# Patient Record
Sex: FEMALE | URBAN - METROPOLITAN AREA
[De-identification: names, ages, dates, MRNs, and addresses within clinical notes are randomized per-mention and may not be internally consistent; named-entity substitution may affect disease eponyms.]

---

## 2018-10-10 ENCOUNTER — APPOINTMENT (RX ONLY)
Dept: URBAN - METROPOLITAN AREA CLINIC 115 | Facility: CLINIC | Age: 58
Setting detail: DERMATOLOGY
End: 2018-10-10

## 2018-10-10 VITALS — HEIGHT: 61 IN | WEIGHT: 148 LBS

## 2018-10-10 DIAGNOSIS — L82.0 INFLAMED SEBORRHEIC KERATOSIS: ICD-10-CM

## 2018-10-10 DIAGNOSIS — D22 MELANOCYTIC NEVI: ICD-10-CM

## 2018-10-10 DIAGNOSIS — L72.8 OTHER FOLLICULAR CYSTS OF THE SKIN AND SUBCUTANEOUS TISSUE: ICD-10-CM

## 2018-10-10 DIAGNOSIS — L81.4 OTHER MELANIN HYPERPIGMENTATION: ICD-10-CM

## 2018-10-10 DIAGNOSIS — D18.0 HEMANGIOMA: ICD-10-CM

## 2018-10-10 DIAGNOSIS — L91.8 OTHER HYPERTROPHIC DISORDERS OF THE SKIN: ICD-10-CM

## 2018-10-10 DIAGNOSIS — L82.1 OTHER SEBORRHEIC KERATOSIS: ICD-10-CM

## 2018-10-10 PROBLEM — D18.01 HEMANGIOMA OF SKIN AND SUBCUTANEOUS TISSUE: Status: ACTIVE | Noted: 2018-10-10

## 2018-10-10 PROBLEM — D22.72 MELANOCYTIC NEVI OF LEFT LOWER LIMB, INCLUDING HIP: Status: ACTIVE | Noted: 2018-10-10

## 2018-10-10 PROCEDURE — 17110 DESTRUCTION B9 LES UP TO 14: CPT

## 2018-10-10 PROCEDURE — ? BENIGN DESTRUCTION COSMETIC

## 2018-10-10 PROCEDURE — ? LIQUID NITROGEN

## 2018-10-10 PROCEDURE — 99203 OFFICE O/P NEW LOW 30 MIN: CPT | Mod: 25

## 2018-10-10 PROCEDURE — ? COUNSELING

## 2018-10-10 ASSESSMENT — LOCATION DETAILED DESCRIPTION DERM
LOCATION DETAILED: LEFT POSTERIOR SHOULDER
LOCATION DETAILED: RIGHT LATERAL EYEBROW
LOCATION DETAILED: EPIGASTRIC SKIN
LOCATION DETAILED: LEFT LATERAL EYEBROW
LOCATION DETAILED: LEFT INFRAMAMMARY CREASE (INNER QUADRANT)
LOCATION DETAILED: RIGHT PROXIMAL RADIAL DORSAL FOREARM
LOCATION DETAILED: RIGHT INFRAMAMMARY CREASE (INNER QUADRANT)
LOCATION DETAILED: RIGHT ANTERIOR DISTAL UPPER ARM
LOCATION DETAILED: LEFT ANTERIOR DISTAL UPPER ARM
LOCATION DETAILED: LEFT SUPERIOR ANTERIOR NECK
LOCATION DETAILED: RIGHT CENTRAL EYEBROW
LOCATION DETAILED: LEFT CENTRAL MALAR CHEEK
LOCATION DETAILED: RIGHT RIB CAGE
LOCATION DETAILED: LEFT CENTRAL EYEBROW
LOCATION DETAILED: RIGHT ANTERIOR DISTAL THIGH
LOCATION DETAILED: LEFT LATERAL TRAPEZIAL NECK
LOCATION DETAILED: LEFT PROXIMAL RADIAL DORSAL FOREARM
LOCATION DETAILED: RIGHT MEDIAL MALAR CHEEK
LOCATION DETAILED: SUBXIPHOID
LOCATION DETAILED: RIGHT MEDIAL BREAST 5-6:00 REGION
LOCATION DETAILED: LEFT ANTERIOR DISTAL THIGH
LOCATION DETAILED: RIGHT LATERAL TRAPEZIAL NECK
LOCATION DETAILED: RIGHT SUPERIOR ANTERIOR NECK

## 2018-10-10 ASSESSMENT — LOCATION SIMPLE DESCRIPTION DERM
LOCATION SIMPLE: RIGHT FOREARM
LOCATION SIMPLE: RIGHT EYEBROW
LOCATION SIMPLE: LEFT CHEEK
LOCATION SIMPLE: LEFT UPPER ARM
LOCATION SIMPLE: LEFT EYEBROW
LOCATION SIMPLE: RIGHT THIGH
LOCATION SIMPLE: LEFT ANTERIOR NECK
LOCATION SIMPLE: LEFT FOREARM
LOCATION SIMPLE: RIGHT ANTERIOR NECK
LOCATION SIMPLE: RIGHT BREAST
LOCATION SIMPLE: POSTERIOR NECK
LOCATION SIMPLE: LEFT SHOULDER
LOCATION SIMPLE: LEFT THIGH
LOCATION SIMPLE: ABDOMEN
LOCATION SIMPLE: RIGHT UPPER ARM
LOCATION SIMPLE: RIGHT CHEEK
LOCATION SIMPLE: LEFT BREAST

## 2018-10-10 ASSESSMENT — LOCATION ZONE DERM
LOCATION ZONE: LEG
LOCATION ZONE: NECK
LOCATION ZONE: TRUNK
LOCATION ZONE: ARM
LOCATION ZONE: FACE

## 2018-10-10 NOTE — PROCEDURE: LIQUID NITROGEN
Post-Care Instructions: I reviewed with the patient in detail post-care instructions. Patient is to wear sunprotection, and avoid picking at any of the treated lesions. Pt may apply Vaseline to crusted or scabbing areas.
Pared With?: 15 blade
Add 52 Modifier (Optional): no
Number Of Freeze-Thaw Cycles: 2 freeze-thaw cycles
Duration Of Freeze Thaw-Cycle (Seconds): 5-10
Medical Necessity Clause: This procedure was medically necessary because the lesions that were treated were: irritated
Medical Necessity Information: It is in your best interest to select a reason for this procedure from the list below. All of these items fulfill various CMS LCD requirements except the new and changing color options.
Consent: The patient's consent was obtained including but not limited to risks of crusting, scabbing, blistering, scarring, darker or lighter pigmentary change, recurrence, incomplete removal and infection.
Detail Level: Detailed

## 2018-10-10 NOTE — PROCEDURE: BENIGN DESTRUCTION COSMETIC
Detail Level: Detailed
Price (Use Numbers Only, No Special Characters Or $): 150.00
Post-Care Instructions: I reviewed with the patient in detail post-care instructions. Patient is to wear sunprotection, and avoid picking at any of the treated lesions. Pt may apply Vaseline to crusted or scabbing areas.
Consent: The patient's consent was obtained including but not limited to risks of crusting, scabbing, blistering, scarring, darker or lighter pigmentary change, recurrence, incomplete removal and infection.
Topical Anesthesia?: 20% benzocaine, 8% lidocaine, 4% tetracaine
Anesthesia Volume In Cc: 0